# Patient Record
Sex: MALE | Race: WHITE | NOT HISPANIC OR LATINO | URBAN - METROPOLITAN AREA
[De-identification: names, ages, dates, MRNs, and addresses within clinical notes are randomized per-mention and may not be internally consistent; named-entity substitution may affect disease eponyms.]

---

## 2018-08-01 ENCOUNTER — EMERGENCY (EMERGENCY)
Facility: HOSPITAL | Age: 53
LOS: 0 days | Discharge: HOME | End: 2018-08-01
Attending: EMERGENCY MEDICINE
Payer: COMMERCIAL

## 2018-08-01 VITALS
RESPIRATION RATE: 18 BRPM | HEART RATE: 84 BPM | DIASTOLIC BLOOD PRESSURE: 98 MMHG | WEIGHT: 235.01 LBS | TEMPERATURE: 98 F | OXYGEN SATURATION: 99 % | SYSTOLIC BLOOD PRESSURE: 158 MMHG | HEIGHT: 68 IN
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
## 2018-08-01 DIAGNOSIS — M25.571 PAIN IN RIGHT ANKLE AND JOINTS OF RIGHT FOOT: ICD-10-CM

## 2018-08-01 DIAGNOSIS — K46.9 UNSPECIFIED ABDOMINAL HERNIA WITHOUT OBSTRUCTION OR GANGRENE: Chronic | ICD-10-CM

## 2018-08-01 DIAGNOSIS — E03.9 HYPOTHYROIDISM, UNSPECIFIED: ICD-10-CM

## 2018-08-01 DIAGNOSIS — Z98.890 OTHER SPECIFIED POSTPROCEDURAL STATES: Chronic | ICD-10-CM

## 2018-08-01 DIAGNOSIS — Z90.49 ACQUIRED ABSENCE OF OTHER SPECIFIED PARTS OF DIGESTIVE TRACT: Chronic | ICD-10-CM

## 2018-08-01 DIAGNOSIS — E66.9 OBESITY, UNSPECIFIED: ICD-10-CM

## 2018-08-01 PROCEDURE — 99283 EMERGENCY DEPT VISIT LOW MDM: CPT

## 2018-08-01 PROCEDURE — 73562 X-RAY EXAM OF KNEE 3: CPT | Mod: 26,RT

## 2018-08-01 RX ORDER — DOCUSATE SODIUM 100 MG
1 CAPSULE ORAL
Qty: 14 | Refills: 0 | OUTPATIENT
Start: 2018-08-01 | End: 2018-08-07

## 2018-08-01 RX ORDER — LEVOTHYROXINE SODIUM 125 MCG
1 TABLET ORAL
Qty: 0 | Refills: 0 | COMMUNITY

## 2018-08-01 RX ORDER — OXYCODONE AND ACETAMINOPHEN 5; 325 MG/1; MG/1
1 TABLET ORAL ONCE
Qty: 0 | Refills: 0 | Status: DISCONTINUED | OUTPATIENT
Start: 2018-08-01 | End: 2018-08-01

## 2018-08-01 RX ADMIN — OXYCODONE AND ACETAMINOPHEN 1 TABLET(S): 5; 325 TABLET ORAL at 15:10

## 2018-08-01 RX ADMIN — OXYCODONE AND ACETAMINOPHEN 1 TABLET(S): 5; 325 TABLET ORAL at 14:36

## 2018-08-01 NOTE — ED PROVIDER NOTE - OBJECTIVE STATEMENT
Pertinent PMH/PSH/FHx/SHx and Review of Systems contained within:  53M hx of obesity pw right knee pain. this afternoon patient fell off a truck. landing on feet but the right knee buckled and now he has pain in the anterior knee and lower quads. no numbness, tingling, weakness or wound.   Fh and Sh not otherwise contributory  ROS otherwise negative

## 2018-08-01 NOTE — ED ADULT TRIAGE NOTE - CHIEF COMPLAINT QUOTE
BIBA for fall, complaint of right knee pain after fall missed the concrete and fell on right knee 1 hour pta.

## 2018-08-01 NOTE — ED PROVIDER NOTE - MEDICAL DECISION MAKING DETAILS
patient pw right knee pain. suspect ligament injury. xray negative. will brace and have follow up with ortho. he has his own that he wants to use.

## 2018-08-01 NOTE — ED PROVIDER NOTE - PHYSICAL EXAMINATION
Gen: Alert, NAD  Head: NC, AT   Eyes: PERRL, EOMI, normal lids/conjunctiva  ENT: normal hearing, patent oropharynx without erythema/exudate, uvula midline  Neck: supple, no tenderness, Trachea midline  Pulm: Bilateral BS, normal resp effort, no wheeze/stridor/retractions  CV: RRR, no M/R/G, 2+ radial and dp pulses bl, no edema  Abd: soft, NT/ND, +BS, no hepatosplenomegaly  Mskel: right knee is painful with passive ranging but there is no effusion. no ctl spine ttp.   Skin: no rash, no bruising   Neuro: AAOx3, no sensory/motor deficits, CN 2-12 intact

## 2018-08-04 ENCOUNTER — EMERGENCY (EMERGENCY)
Facility: HOSPITAL | Age: 53
LOS: 1 days | Discharge: HOME | End: 2018-08-04
Attending: EMERGENCY MEDICINE
Payer: COMMERCIAL

## 2018-08-04 VITALS
SYSTOLIC BLOOD PRESSURE: 146 MMHG | OXYGEN SATURATION: 98 % | TEMPERATURE: 99 F | HEART RATE: 89 BPM | RESPIRATION RATE: 18 BRPM | DIASTOLIC BLOOD PRESSURE: 65 MMHG

## 2018-08-04 VITALS
WEIGHT: 235.01 LBS | OXYGEN SATURATION: 97 % | SYSTOLIC BLOOD PRESSURE: 159 MMHG | TEMPERATURE: 98 F | HEART RATE: 92 BPM | RESPIRATION RATE: 17 BRPM | DIASTOLIC BLOOD PRESSURE: 91 MMHG | HEIGHT: 68 IN

## 2018-08-04 DIAGNOSIS — Z98.890 OTHER SPECIFIED POSTPROCEDURAL STATES: Chronic | ICD-10-CM

## 2018-08-04 DIAGNOSIS — Z90.49 ACQUIRED ABSENCE OF OTHER SPECIFIED PARTS OF DIGESTIVE TRACT: Chronic | ICD-10-CM

## 2018-08-04 DIAGNOSIS — K46.9 UNSPECIFIED ABDOMINAL HERNIA WITHOUT OBSTRUCTION OR GANGRENE: Chronic | ICD-10-CM

## 2018-08-04 LAB
ALBUMIN SERPL ELPH-MCNC: 4.2 G/DL — SIGNIFICANT CHANGE UP (ref 3.3–5)
ALP SERPL-CCNC: 66 U/L — SIGNIFICANT CHANGE UP (ref 40–120)
ALT FLD-CCNC: 70 U/L — HIGH (ref 10–45)
ANION GAP SERPL CALC-SCNC: 10 MMOL/L — SIGNIFICANT CHANGE UP (ref 5–17)
APTT BLD: 26.7 SEC — LOW (ref 27.5–37.4)
AST SERPL-CCNC: 34 U/L — SIGNIFICANT CHANGE UP (ref 10–40)
BASOPHILS # BLD AUTO: 0 K/UL — SIGNIFICANT CHANGE UP (ref 0–0.2)
BASOPHILS NFR BLD AUTO: 0.3 % — SIGNIFICANT CHANGE UP (ref 0–2)
BILIRUB SERPL-MCNC: 0.7 MG/DL — SIGNIFICANT CHANGE UP (ref 0.2–1.2)
BLD GP AB SCN SERPL QL: NEGATIVE — SIGNIFICANT CHANGE UP
BUN SERPL-MCNC: 18 MG/DL — SIGNIFICANT CHANGE UP (ref 7–23)
CALCIUM SERPL-MCNC: 9.7 MG/DL — SIGNIFICANT CHANGE UP (ref 8.4–10.5)
CHLORIDE SERPL-SCNC: 99 MMOL/L — SIGNIFICANT CHANGE UP (ref 96–108)
CO2 SERPL-SCNC: 30 MMOL/L — SIGNIFICANT CHANGE UP (ref 22–31)
CREAT SERPL-MCNC: 1.02 MG/DL — SIGNIFICANT CHANGE UP (ref 0.5–1.3)
CRP SERPL-MCNC: 2.16 MG/DL — HIGH (ref 0–0.4)
EOSINOPHIL # BLD AUTO: 0.2 K/UL — SIGNIFICANT CHANGE UP (ref 0–0.5)
EOSINOPHIL NFR BLD AUTO: 1.7 % — SIGNIFICANT CHANGE UP (ref 0–6)
ERYTHROCYTE [SEDIMENTATION RATE] IN BLOOD: 21 MM/HR — HIGH (ref 0–20)
GAS PNL BLDV: SIGNIFICANT CHANGE UP
GLUCOSE SERPL-MCNC: 159 MG/DL — HIGH (ref 70–99)
HCT VFR BLD CALC: 42.1 % — SIGNIFICANT CHANGE UP (ref 39–50)
HGB BLD-MCNC: 15.2 G/DL — SIGNIFICANT CHANGE UP (ref 13–17)
INR BLD: 1.09 RATIO — SIGNIFICANT CHANGE UP (ref 0.88–1.16)
LYMPHOCYTES # BLD AUTO: 1.7 K/UL — SIGNIFICANT CHANGE UP (ref 1–3.3)
LYMPHOCYTES # BLD AUTO: 13.8 % — SIGNIFICANT CHANGE UP (ref 13–44)
MCHC RBC-ENTMCNC: 31.5 PG — SIGNIFICANT CHANGE UP (ref 27–34)
MCHC RBC-ENTMCNC: 36.1 GM/DL — HIGH (ref 32–36)
MCV RBC AUTO: 87.3 FL — SIGNIFICANT CHANGE UP (ref 80–100)
MONOCYTES # BLD AUTO: 0.9 K/UL — SIGNIFICANT CHANGE UP (ref 0–0.9)
MONOCYTES NFR BLD AUTO: 7.5 % — SIGNIFICANT CHANGE UP (ref 2–14)
NEUTROPHILS # BLD AUTO: 9.5 K/UL — HIGH (ref 1.8–7.4)
NEUTROPHILS NFR BLD AUTO: 76.6 % — SIGNIFICANT CHANGE UP (ref 43–77)
PLATELET # BLD AUTO: 221 K/UL — SIGNIFICANT CHANGE UP (ref 150–400)
POTASSIUM SERPL-MCNC: 4.1 MMOL/L — SIGNIFICANT CHANGE UP (ref 3.5–5.3)
POTASSIUM SERPL-SCNC: 4.1 MMOL/L — SIGNIFICANT CHANGE UP (ref 3.5–5.3)
PROT SERPL-MCNC: 7.3 G/DL — SIGNIFICANT CHANGE UP (ref 6–8.3)
PROTHROM AB SERPL-ACNC: 11.9 SEC — SIGNIFICANT CHANGE UP (ref 9.8–12.7)
RBC # BLD: 4.83 M/UL — SIGNIFICANT CHANGE UP (ref 4.2–5.8)
RBC # FLD: 12.6 % — SIGNIFICANT CHANGE UP (ref 10.3–14.5)
RH IG SCN BLD-IMP: POSITIVE — SIGNIFICANT CHANGE UP
SODIUM SERPL-SCNC: 139 MMOL/L — SIGNIFICANT CHANGE UP (ref 135–145)
WBC # BLD: 12.4 K/UL — HIGH (ref 3.8–10.5)
WBC # FLD AUTO: 12.4 K/UL — HIGH (ref 3.8–10.5)

## 2018-08-04 PROCEDURE — 82565 ASSAY OF CREATININE: CPT

## 2018-08-04 PROCEDURE — 85610 PROTHROMBIN TIME: CPT

## 2018-08-04 PROCEDURE — 73562 X-RAY EXAM OF KNEE 3: CPT

## 2018-08-04 PROCEDURE — 82947 ASSAY GLUCOSE BLOOD QUANT: CPT

## 2018-08-04 PROCEDURE — 82803 BLOOD GASES ANY COMBINATION: CPT

## 2018-08-04 PROCEDURE — 85027 COMPLETE CBC AUTOMATED: CPT

## 2018-08-04 PROCEDURE — 84132 ASSAY OF SERUM POTASSIUM: CPT

## 2018-08-04 PROCEDURE — 86901 BLOOD TYPING SEROLOGIC RH(D): CPT

## 2018-08-04 PROCEDURE — 85730 THROMBOPLASTIN TIME PARTIAL: CPT

## 2018-08-04 PROCEDURE — 85652 RBC SED RATE AUTOMATED: CPT

## 2018-08-04 PROCEDURE — 84295 ASSAY OF SERUM SODIUM: CPT

## 2018-08-04 PROCEDURE — 82435 ASSAY OF BLOOD CHLORIDE: CPT

## 2018-08-04 PROCEDURE — 85014 HEMATOCRIT: CPT

## 2018-08-04 PROCEDURE — 73562 X-RAY EXAM OF KNEE 3: CPT | Mod: 26,RT

## 2018-08-04 PROCEDURE — 86140 C-REACTIVE PROTEIN: CPT

## 2018-08-04 PROCEDURE — 99284 EMERGENCY DEPT VISIT MOD MDM: CPT

## 2018-08-04 PROCEDURE — 82330 ASSAY OF CALCIUM: CPT

## 2018-08-04 PROCEDURE — 86850 RBC ANTIBODY SCREEN: CPT

## 2018-08-04 PROCEDURE — 86900 BLOOD TYPING SEROLOGIC ABO: CPT

## 2018-08-04 PROCEDURE — 80053 COMPREHEN METABOLIC PANEL: CPT

## 2018-08-04 PROCEDURE — 83605 ASSAY OF LACTIC ACID: CPT

## 2018-08-04 RX ORDER — ACETAMINOPHEN 500 MG
650 TABLET ORAL ONCE
Qty: 0 | Refills: 0 | Status: COMPLETED | OUTPATIENT
Start: 2018-08-04 | End: 2018-08-04

## 2018-08-04 RX ORDER — MORPHINE SULFATE 50 MG/1
4 CAPSULE, EXTENDED RELEASE ORAL ONCE
Qty: 0 | Refills: 0 | Status: COMPLETED | OUTPATIENT
Start: 2018-08-04 | End: 2018-08-04

## 2018-08-04 RX ADMIN — Medication 650 MILLIGRAM(S): at 18:57

## 2018-08-04 NOTE — CONSULT NOTE ADULT - SUBJECTIVE AND OBJECTIVE BOX
53y Male presents c/o R knee pain sp mechanical fall. 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 Pt unable to ambulate 2/2 R knee pain/weakness after the incident. Pt was seen at [facility] that day. Had xrays done that were negative, was given a knee immobilizer and told to FU outpatient. Pt saw Dr. Jacobsen who ordered an MRI and after he saw it told the patient to come to ED for further evaluation. Denies HS/LOC. Denies numbness/tingling. Pt admits to subjective fever, denies chills. No other orthopedic complaints at this time.      PAST MEDICAL & SURGICAL HISTORY:  Hypertension  Graves disease    MEDICATIONS  (STANDING):  morphine  - Injectable 4 milliGRAM(s) IV Push Once    Allergies    No Known Allergies    Intolerances                                15.2   12.4  )-----------( 221      ( 04 Aug 2018 17:53 )             42.1     04 Aug 2018 17:53    139    |  99     |  18     ----------------------------<  159    4.1     |  30     |  1.02     Ca    9.7        04 Aug 2018 17:53    TPro  7.3    /  Alb  4.2    /  TBili  0.7    /  DBili  x      /  AST  34     /  ALT  70     /  AlkPhos  66     04 Aug 2018 17:53      Vital Signs Last 24 Hrs  T(C): 37.2 (08-04-18 @ 18:11), Max: 37.2 (08-04-18 @ 18:11)  T(F): 98.9 (08-04-18 @ 18:11), Max: 98.9 (08-04-18 @ 18:11)  HR: 89 (08-04-18 @ 18:11) (89 - 92)  BP: 146/65 (08-04-18 @ 18:11) (146/65 - 159/91)  BP(mean): --  RR: 18 (08-04-18 @ 18:11) (17 - 18)  SpO2: 98% (08-04-18 @ 18:11) (97% - 98%)    Gen: NAD  RLE: skin intact, +ttp superior patella with palpable defect, +knee effusion, unable to SLR, extensor mechanism disrupted, no bony ttp elsewhere, negative log roll, no calf ttp, +ehl/fhl/ta/gs function, l2-s1 silt, dp pt pulse intact, warm/well perfused    Secondary survey: benign, no bony ttp elsewhere, nv intact    Imaging: XR R knee demonstrates no obvious patella fracture, possible patella baja    Outpatient MRI R Knee reviewed that demonstrates apparent R quad tendon tear

## 2018-08-04 NOTE — CONSULT NOTE ADULT - ASSESSMENT
A/P: 53y Male w R quad tendon tear  Pain control  WBAT RLE in BJKI, keep c/d/i, assistive devices as needed  No knee flexion  ice/elevation  No acute ortho surgical intervention   Possible need for surgical intervention in future/outpatient discussed  Follow up with Dr. Rosenberg as outpatient on Monday or Tuesday, call office for appointment  Ortho stable

## 2018-08-04 NOTE — ED PROVIDER NOTE - OBJECTIVE STATEMENT
53 y old male hs of hypertension ,sp injury his rt knee on Tuesday ,seen at [facility] ,had negative xray ,was follow up with ortho and had an MRI done yesterday ,Got a phone call to day from ortho to go to ED because he has tear of one of the muscle in a calf ,Came in today complains of swollen ,warm rt knee ,unable to move it and fever ,

## 2018-08-04 NOTE — ED PROVIDER NOTE - CARE PLAN
Principal Discharge DX:	Tendon rupture, nontraumatic, quadriceps, right  Assessment and plan of treatment:	Take all medications as directed.  Acetaminophen (Tylenol) 250mg-650mg every 4 hours.  Follow up with your Orthopedists Dr. Rosenberg.  Use knee immobilizer and crutches.  Return to the ER for worsening symptoms or any other concerns.

## 2018-08-04 NOTE — ED PROVIDER NOTE - PROGRESS NOTE DETAILS
ortho Seen by ortho, MRI reviewed, revealed rt quadricep tendon rupture.  Patient offered admission for surgery tomorrow, declined.  He wants to think about it.  Advised to keep knee immobilizer and crutches and follow up with Dr. Rosenberg.  ZR

## 2018-08-04 NOTE — ED PROVIDER NOTE - MEDICAL DECISION MAKING DETAILS
SP TRAUMA to rt knee ,abnormal MRI ,co fever and swollen rt knee with diminish ROM  ,Will obtain blood work ,xray of the knee ,ortho consult and on reevaluation: ZR

## 2018-08-04 NOTE — ED ADULT NURSE NOTE - NSIMPLEMENTINTERV_GEN_ALL_ED
Implemented All Fall Risk Interventions:  [place] to call system. Call bell, personal items and telephone within reach. Instruct patient to call for assistance. Room bathroom lighting operational. Non-slip footwear when patient is off stretcher. Physically safe environment: no spills, clutter or unnecessary equipment. Stretcher in lowest position, wheels locked, appropriate side rails in place. Provide visual cue, wrist band, yellow gown, etc. Monitor gait and stability. Monitor for mental status changes and reorient to person, place, and time. Review medications for side effects contributing to fall risk. Reinforce activity limits and safety measures with patient and family.

## 2018-08-04 NOTE — ED ADULT NURSE NOTE - OBJECTIVE STATEMENT
Pt is an ambulatory 53 yr old male a/oX 3 c/o right knee pain since fall last week.  Ptw as seen at orthopedist office outpatient and told he has a calf tear although the medial aspect of his knee is swollen, warm to touch, tender and red.  Denies drainage.  PERRl wnl, carney with equal strength.  LUNGS CTA no chest pain or sob.  No N/V/F.  Abdomen NT ND witH BS+4Q.  SKIN WDI.  Peripheral pulses +2bl no edema

## 2018-08-04 NOTE — ED PROVIDER NOTE - PLAN OF CARE
Take all medications as directed.  Acetaminophen (Tylenol) 250mg-650mg every 4 hours.  Follow up with your Orthopedists Dr. Rosenberg.  Use knee immobilizer and crutches.  Return to the ER for worsening symptoms or any other concerns.

## 2018-08-05 NOTE — ED POST DISCHARGE NOTE - DETAILS
Elevated ESR and CRP in the context of a patella tendon rupture.  Pt still with pain but feeling better.  NO fevers, has follow up scheduled for tomorrow.  Day [id number]

## 2018-08-13 PROBLEM — E03.9 HYPOTHYROIDISM, UNSPECIFIED: Chronic | Status: ACTIVE | Noted: 2018-08-01

## 2019-12-16 NOTE — ED ADULT NURSE NOTE - BREATH SOUNDS, MLM
Problem: Falls - Risk of 
Goal: *Absence of Falls Description Document Home Bernstein Fall Risk and appropriate interventions in the flowsheet. Outcome: Progressing Towards Goal 
Note: Fall Risk Interventions: 
Mobility Interventions: Patient to call before getting OOB Mentation Interventions: Adequate sleep, hydration, pain control Medication Interventions: Patient to call before getting OOB Elimination Interventions: Bed/chair exit alarm History of Falls Interventions: Bed/chair exit alarm Problem: Patient Education: Go to Patient Education Activity Goal: Patient/Family Education Outcome: Progressing Towards Goal 
  
Problem: Inpatient Rehab (Adult) Goal: *LTG: Avoids injury/falls 100% of time related to deficits Outcome: Progressing Towards Goal 
Goal: *LTG: Avoids infection 100% of time related to deficits Outcome: Progressing Towards Goal 
Goal: *LTG: Verbalize understanding of diagnosis and risk factors for recurring stroke Outcome: Progressing Towards Goal 
Goal: *LTG: Absence of DVT during hospitalization Outcome: Progressing Towards Goal 
Goal: *LTG: Maintains Skin Integrity With No Evidence of Pressure Injury 100% of Time Outcome: Progressing Towards Goal 
  
Problem: Pressure Injury - Risk of 
Goal: *Prevention of pressure injury Description Document Arnulfo Scale and appropriate interventions in the flowsheet. Outcome: Progressing Towards Goal 
Note: Pressure Injury Interventions: 
Sensory Interventions: Assess changes in LOC Moisture Interventions: Absorbent underpads, Apply protective barrier, creams and emollients Activity Interventions: Increase time out of bed, Pressure redistribution bed/mattress(bed type) Mobility Interventions: Chair cushion, Float heels, HOB 30 degrees or less, Pressure redistribution bed/mattress (bed type) Nutrition Interventions: Document food/fluid/supplement intake Friction and Shear Interventions: Apply protective barrier, creams and emollients, Feet elevated on foot rest, Foam dressings/transparent film/skin sealants, HOB 30 degrees or less, Lift sheet Problem: Patient Education: Go to Patient Education Activity Goal: Patient/Family Education Outcome: Progressing Towards Goal 
  
 Clear
